# Patient Record
Sex: FEMALE | Employment: UNEMPLOYED | URBAN - METROPOLITAN AREA
[De-identification: names, ages, dates, MRNs, and addresses within clinical notes are randomized per-mention and may not be internally consistent; named-entity substitution may affect disease eponyms.]

---

## 2015-12-14 LAB
ALT SERPL-CCNC: 12 U/L (ref 9–41)
AST SERPL-CCNC: 14 U/L (ref 0–33)
CREAT SERPL-MCNC: 0.5 MG/DL (ref 0.8–1.4)
GLUCOSE SERPL-MCNC: 86 MG/DL (ref 70–115)
POTASSIUM SERPL-SCNC: 4.5 MMOL/L (ref 3.6–5)

## 2016-02-01 LAB
ALT SERPL-CCNC: 29 U/L (ref 9–41)
AST SERPL-CCNC: 17 U/L (ref 0–33)
CREAT SERPL-MCNC: 0.5 MG/DL (ref 0.8–1.4)
GLUCOSE SERPL-MCNC: 86 MG/DL (ref 70–115)
POTASSIUM SERPL-SCNC: 4.5 MMOL/L (ref 3.6–5)

## 2016-02-18 LAB
ALT SERPL-CCNC: 48 U/L (ref 9–41)
AST SERPL-CCNC: 31 U/L (ref 0–33)
CREAT SERPL-MCNC: 0.5 MG/DL (ref 0.8–1.4)
GLUCOSE SERPL-MCNC: 71 MG/DL (ref 70–115)
POTASSIUM SERPL-SCNC: 4.2 MMOL/L (ref 3.6–5)

## 2016-03-02 LAB
ALT SERPL-CCNC: 30 U/L (ref 9–41)
AST SERPL-CCNC: 22 U/L (ref 0–33)
CREAT SERPL-MCNC: 0.6 MG/DL (ref 0.8–1.4)
GLUCOSE SERPL-MCNC: 103 MG/DL (ref 70–115)
POTASSIUM SERPL-SCNC: 4.1 MMOL/L (ref 3.6–5)

## 2016-03-18 LAB
ALT SERPL-CCNC: 40 U/L (ref 9–41)
AST SERPL-CCNC: 27 U/L (ref 0–33)
CREAT SERPL-MCNC: 0.5 MG/DL (ref 0.8–1.4)
GLUCOSE SERPL-MCNC: 73 MG/DL (ref 70–115)
POTASSIUM SERPL-SCNC: 4.3 MMOL/L (ref 3.6–5)

## 2016-03-31 LAB
ALT SERPL-CCNC: 31 U/L (ref 9–41)
AST SERPL-CCNC: 22 U/L (ref 0–33)
CREAT SERPL-MCNC: 0.5 MG/DL (ref 0.8–1.4)
GLUCOSE SERPL-MCNC: 77 MG/DL (ref 70–115)
POTASSIUM SERPL-SCNC: 4.1 MMOL/L (ref 3.6–5)

## 2016-04-14 LAB
ALT SERPL-CCNC: 37 U/L (ref 9–41)
AST SERPL-CCNC: 29 U/L (ref 0–33)
CREAT SERPL-MCNC: 0.5 MG/DL (ref 0.8–1.4)
GLUCOSE SERPL-MCNC: 83 MG/DL (ref 70–115)
POTASSIUM SERPL-SCNC: 4.1 MMOL/L (ref 3.6–5)

## 2016-06-09 LAB
ALT SERPL-CCNC: 34 U/L (ref 9–41)
AST SERPL-CCNC: 23 U/L (ref 0–33)
CREAT SERPL-MCNC: 0.5 MG/DL (ref 0.8–1.4)
GLUCOSE SERPL-MCNC: 79 MG/DL (ref 70–115)
POTASSIUM SERPL-SCNC: 4.4 MMOL/L (ref 3.6–5)

## 2017-10-06 ENCOUNTER — TRANSFERRED RECORDS (OUTPATIENT)
Dept: HEALTH INFORMATION MANAGEMENT | Facility: CLINIC | Age: 32
End: 2017-10-06

## 2017-10-06 LAB
ALT SERPL-CCNC: 13 U/L (ref 9–41)
AST SERPL-CCNC: 16 U/L (ref 0–33)
CREAT SERPL-MCNC: 0.5 MG/DL (ref 0.8–1.4)
GLUCOSE SERPL-MCNC: 115 MG/DL (ref 70–115)
POTASSIUM SERPL-SCNC: 4.2 MMOL/L (ref 3.6–5)

## 2018-03-07 ENCOUNTER — TRANSFERRED RECORDS (OUTPATIENT)
Dept: HEALTH INFORMATION MANAGEMENT | Facility: CLINIC | Age: 33
End: 2018-03-07

## 2018-03-07 LAB
ALT SERPL-CCNC: 9 U/L (ref 9–41)
AST SERPL-CCNC: 14 U/L (ref 0–33)
CREAT SERPL-MCNC: 0.6 MG/DL (ref 0.8–1.4)
GLUCOSE SERPL-MCNC: 85 MG/DL (ref 70–115)
POTASSIUM SERPL-SCNC: 4.1 MMOL/L (ref 3.6–5)

## 2019-01-21 ENCOUNTER — TRANSFERRED RECORDS (OUTPATIENT)
Dept: HEALTH INFORMATION MANAGEMENT | Facility: CLINIC | Age: 34
End: 2019-01-21

## 2019-01-21 LAB
ALT SERPL-CCNC: 41 U/L (ref 34–114)
AST SERPL-CCNC: 10 U/L (ref 0–33)
CREAT SERPL-MCNC: 0.5 MG/DL (ref 0.8–1.4)
GLUCOSE SERPL-MCNC: 119 MG/DL (ref 70–115)
POTASSIUM SERPL-SCNC: 4 MMOL/L (ref 3.6–5)

## 2019-01-25 ENCOUNTER — TRANSFERRED RECORDS (OUTPATIENT)
Dept: HEALTH INFORMATION MANAGEMENT | Facility: CLINIC | Age: 34
End: 2019-01-25

## 2019-05-23 ENCOUNTER — TRANSFERRED RECORDS (OUTPATIENT)
Dept: HEALTH INFORMATION MANAGEMENT | Facility: CLINIC | Age: 34
End: 2019-05-23

## 2019-11-14 ENCOUNTER — OFFICE VISIT (OUTPATIENT)
Dept: FAMILY MEDICINE | Facility: CLINIC | Age: 34
End: 2019-11-14
Payer: COMMERCIAL

## 2019-11-14 VITALS
OXYGEN SATURATION: 98 % | BODY MASS INDEX: 32.05 KG/M2 | SYSTOLIC BLOOD PRESSURE: 101 MMHG | TEMPERATURE: 98.1 F | HEIGHT: 59 IN | DIASTOLIC BLOOD PRESSURE: 70 MMHG | HEART RATE: 73 BPM | WEIGHT: 159 LBS

## 2019-11-14 DIAGNOSIS — R07.82 INTERCOSTAL PAIN: Primary | ICD-10-CM

## 2019-11-14 DIAGNOSIS — S29.012A UPPER BACK STRAIN, INITIAL ENCOUNTER: ICD-10-CM

## 2019-11-14 PROCEDURE — 93000 ELECTROCARDIOGRAM COMPLETE: CPT | Performed by: PHYSICIAN ASSISTANT

## 2019-11-14 PROCEDURE — 99203 OFFICE O/P NEW LOW 30 MIN: CPT | Performed by: PHYSICIAN ASSISTANT

## 2019-11-14 RX ORDER — METHOCARBAMOL 750 MG/1
750 TABLET, FILM COATED ORAL 3 TIMES DAILY PRN
Qty: 45 TABLET | Refills: 3 | Status: SHIPPED | OUTPATIENT
Start: 2019-11-14

## 2019-11-14 RX ORDER — DICLOFENAC SODIUM 75 MG/1
75 TABLET, DELAYED RELEASE ORAL 2 TIMES DAILY PRN
Qty: 30 TABLET | Refills: 1 | Status: SHIPPED | OUTPATIENT
Start: 2019-11-14

## 2019-11-14 SDOH — HEALTH STABILITY: MENTAL HEALTH: HOW OFTEN DO YOU HAVE A DRINK CONTAINING ALCOHOL?: MONTHLY OR LESS

## 2019-11-14 SDOH — HEALTH STABILITY: MENTAL HEALTH: HOW MANY STANDARD DRINKS CONTAINING ALCOHOL DO YOU HAVE ON A TYPICAL DAY?: 1 OR 2

## 2019-11-14 ASSESSMENT — PAIN SCALES - GENERAL: PAINLEVEL: NO PAIN (0)

## 2019-11-14 ASSESSMENT — MIFFLIN-ST. JEOR: SCORE: 1331.85

## 2019-11-14 NOTE — PATIENT INSTRUCTIONS
Diclofenac 75 mg twice a day -take with food for 7 days then use as needed   Robaxin 1 tablet three times a day for 7 days then use as needed for muscle tightness       Patient Education     Noncardiac Chest Pain    Based on your visit today, the healthcare provider doesn t know what is causing your chest pain. In most cases, people who come to the emergency department with chest pain don t have a problem with their heart. Instead, the pain is caused by other conditions. It's important for the healthcare team to be sure you are not having a life threatening cause for chest pain such as a heart attack, blood clot in the lungs, collapsed lung, ruptured esophagus, or tearing of the aorta. Once these major causes have been ruled out, you may have further evaluation for non-heart causes of chest pain. These may be problems with the lungs, muscles, bones, digestive tract, nerves, or mental health.  Lung problems    Inflammation around the lungs (pleurisy)    Collapsed lung (pneumothorax)    Fluid around the lungs (pleural effusion)    Lung cancer (a rare cause of chest pain)  Muscle or bone problems    Inflamed cartilage between the ribs (costochondritis)    Fibromyalgia    Rheumatoid arthritis    Chest wall strain  Digestive system problems    Reflux    Stomach ulcer    Spasms of the esophagus    Gall stones    Gallbladder inflammation  Mental health conditions    Panic or anxiety attacks    Emotional distress  Your condition doesn t seem serious and your pain doesn t appear to be coming from your heart. But sometimes the signs of a serious problem take more time to appear. Watch for the warning signs listed below.  Home care  Follow these guidelines when caring for yourself at home:    Rest today and avoid strenuous activity.    Take any prescribed medicine as directed.  Follow-up care  Follow up with your healthcare provider, or as advised, if you don t start to feel better within 24 hours.  When to seek medical  advice  Call your healthcare provider right away if any of these occur:    A change in the type of pain. Call if it feels different, becomes more serious, lasts longer, or begins to spread into your shoulder, arm, neck, jaw, or back.    Shortness of breath    You feel more pain when you breathe    Cough with dark-colored mucus or blood    Weakness, dizziness, or fainting    Fever of 100.4 F (38 C) or higher, or as directed by your healthcare provider    Swelling, pain, or redness in one leg  Date Last Reviewed: 12/1/2016 2000-2018 [employer]. [address] [place] [address]. All rights reserved. This information is not intended as a substitute for professional medical care. Always follow your healthcare professional's instructions.

## 2019-11-14 NOTE — PROGRESS NOTES
"Subjective     Hemanth Vincent is a 34 year old female who presents to clinic today for the following health issues:    HPI   CHEST PAIN     Onset: 1 month    Description:   Location:  left side  Character: sharp and Stabbing  Radiation: on left side and from left side over shoulder happens when patient lifts her arm up on the couch  Duration: 2-3 minutes and intermittent     Intensity: mild    Progression of Symptoms:  same    Accompanying Signs & Symptoms:  Shortness of breath: no  Sweating: no  Nausea/vomiting: no  Lightheadedness: no  Palpitations: no  Fever/Chills: no  Cough: no  Heartburn: no    History:   Family history of heart disease YES  Tobacco use: no    Precipitating factors:   Worse with exertion: no  Worse with deep breaths :  no  Related to food: YES    Alleviating factors:  none    Patient denies exertional chest pain, no shortness of breath,  no lightheadedness  Patient swims 3-4 times a week and is asymptomatic and feel \"good\" after exercise      Therapies Tried and outcome: none    Patient has high cholesterol last lab was this summer, no medications were prescribed. Patient is from [place].     There is no problem list on file for this patient.    History reviewed. No pertinent surgical history.    Social History     Tobacco Use     Smoking status: Never Smoker     Smokeless tobacco: Never Used   Substance Use Topics     Alcohol use: Yes     Frequency: Monthly or less     Drinks per session: 1 or 2     Family History   Problem Relation Age of Onset     Breast Cancer Mother      Heart Disease Father      Hypertension Father      Hyperlipidemia Father      Heart Disease Paternal Grandmother          Current Outpatient Medications   Medication Sig Dispense Refill     diclofenac (VOLTAREN) 75 MG EC tablet Take 1 tablet (75 mg) by mouth 2 times daily as needed for moderate pain 30 tablet 1     methocarbamol (ROBAXIN) 750 MG tablet Take 1 tablet (750 mg) by mouth 3 times daily as needed for muscle " "spasms 45 tablet 3     No Known Allergies      Reviewed and updated as needed this visit by Provider  Tobacco  Allergies  Meds  Problems  Med Hx  Surg Hx  Fam Hx         Review of Systems   ROS COMP: Constitutional, HEENT, cardiovascular, pulmonary, gi and gu systems are negative, except as otherwise noted.      Objective    /70 (BP Location: Right arm, Patient Position: Chair, Cuff Size: Adult Large)   Pulse 73   Temp 98.1  F (36.7  C) (Oral)   Ht 1.499 m (4' 11\")   Wt 72.1 kg (159 lb)   SpO2 98%   BMI 32.11 kg/m    Body mass index is 32.11 kg/m .  Physical Exam   GENERAL: healthy, alert and no distress  NECK: no adenopathy, no asymmetry, masses, or scars and thyroid normal to palpation  RESP: lungs clear to auscultation - no rales, rhonchi or wheezes  CV: regular rate and rhythm, normal S1 S2, no S3 or S4, no murmur, click or rub, no peripheral edema and peripheral pulses strong  ABDOMEN: soft, nontender, no hepatosplenomegaly, no masses and bowel sounds normal  MS: no gross musculoskeletal defects noted, no edema, tenderness on palpation of the     Diagnostic Test Results:  Labs reviewed in Epic  EKG - negative, NSR, no acute STwave changes         Assessment & Plan       ICD-10-CM    1. Intercostal pain R07.82 EKG 12-lead complete w/read - Clinics     methocarbamol (ROBAXIN) 750 MG tablet     diclofenac (VOLTAREN) 75 MG EC tablet   2. Upper back strain, initial encounter S29.012A EKG 12-lead complete w/read - Clinics     methocarbamol (ROBAXIN) 750 MG tablet     diclofenac (VOLTAREN) 75 MG EC tablet        BMI:   Estimated body mass index is 32.11 kg/m  as calculated from the following:    Height as of this encounter: 1.499 m (4' 11\").    Weight as of this encounter: 72.1 kg (159 lb).               No follow-ups on file.    Jeane Ndiaye PA-C  [facility]      "

## 2020-03-11 ENCOUNTER — HEALTH MAINTENANCE LETTER (OUTPATIENT)
Age: 35
End: 2020-03-11

## 2021-01-04 ENCOUNTER — HEALTH MAINTENANCE LETTER (OUTPATIENT)
Age: 36
End: 2021-01-04

## 2021-04-25 ENCOUNTER — HEALTH MAINTENANCE LETTER (OUTPATIENT)
Age: 36
End: 2021-04-25

## 2021-10-10 ENCOUNTER — HEALTH MAINTENANCE LETTER (OUTPATIENT)
Age: 36
End: 2021-10-10

## 2022-03-22 PROCEDURE — 88341 IMHCHEM/IMCYTCHM EA ADD ANTB: CPT | Mod: 26 | Performed by: PATHOLOGY

## 2022-03-22 PROCEDURE — 88307 TISSUE EXAM BY PATHOLOGIST: CPT | Mod: 26 | Performed by: PATHOLOGY

## 2022-03-22 PROCEDURE — 88307 TISSUE EXAM BY PATHOLOGIST: CPT | Mod: TC,ORL | Performed by: INTERNAL MEDICINE

## 2022-03-22 PROCEDURE — 88342 IMHCHEM/IMCYTCHM 1ST ANTB: CPT | Mod: 26 | Performed by: PATHOLOGY

## 2022-03-22 PROCEDURE — 88305 TISSUE EXAM BY PATHOLOGIST: CPT | Mod: 26 | Performed by: PATHOLOGY

## 2022-03-23 ENCOUNTER — LAB REQUISITION (OUTPATIENT)
Dept: LAB | Facility: CLINIC | Age: 37
End: 2022-03-23
Payer: COMMERCIAL

## 2022-03-23 DIAGNOSIS — K86.2 CYST OF PANCREAS: ICD-10-CM

## 2022-03-23 DIAGNOSIS — R93.3 ABNORMAL FINDINGS ON DIAGNOSTIC IMAGING OF OTHER PARTS OF DIGESTIVE TRACT: ICD-10-CM

## 2022-03-23 DIAGNOSIS — K92.2 GASTROINTESTINAL HEMORRHAGE, UNSPECIFIED: ICD-10-CM

## 2022-03-23 DIAGNOSIS — I89.9 NONINFECTIVE DISORDER OF LYMPHATIC VESSELS AND LYMPH NODES, UNSPECIFIED: ICD-10-CM

## 2022-03-23 DIAGNOSIS — R10.13 EPIGASTRIC PAIN: ICD-10-CM

## 2022-03-23 DIAGNOSIS — K59.1 FUNCTIONAL DIARRHEA: ICD-10-CM

## 2022-03-23 DIAGNOSIS — K86.89 OTHER SPECIFIED DISEASES OF PANCREAS: ICD-10-CM

## 2022-03-25 LAB
PATH REPORT.ADDENDUM SPEC: NORMAL
PATH REPORT.COMMENTS IMP SPEC: NORMAL
PATH REPORT.COMMENTS IMP SPEC: NORMAL
PATH REPORT.FINAL DX SPEC: NORMAL
PATH REPORT.GROSS SPEC: NORMAL
PATH REPORT.MICROSCOPIC SPEC OTHER STN: NORMAL
PHOTO IMAGE: NORMAL

## 2022-05-22 ENCOUNTER — HEALTH MAINTENANCE LETTER (OUTPATIENT)
Age: 37
End: 2022-05-22

## 2022-09-18 ENCOUNTER — HEALTH MAINTENANCE LETTER (OUTPATIENT)
Age: 37
End: 2022-09-18

## 2023-05-07 ENCOUNTER — HEALTH MAINTENANCE LETTER (OUTPATIENT)
Age: 38
End: 2023-05-07